# Patient Record
Sex: FEMALE | Race: ASIAN | Employment: OTHER | URBAN - METROPOLITAN AREA
[De-identification: names, ages, dates, MRNs, and addresses within clinical notes are randomized per-mention and may not be internally consistent; named-entity substitution may affect disease eponyms.]

---

## 2018-06-05 RX ORDER — CETIRIZINE HCL 10 MG
TABLET ORAL
COMMUNITY
End: 2019-07-17

## 2018-06-06 ENCOUNTER — ANESTHESIA EVENT (OUTPATIENT)
Dept: ENDOSCOPY | Age: 78
End: 2018-06-06
Payer: MEDICARE

## 2018-06-07 ENCOUNTER — ANESTHESIA (OUTPATIENT)
Dept: ENDOSCOPY | Age: 78
End: 2018-06-07
Payer: MEDICARE

## 2018-06-07 ENCOUNTER — HOSPITAL ENCOUNTER (OUTPATIENT)
Age: 78
Setting detail: OUTPATIENT SURGERY
Discharge: HOME | End: 2018-06-07
Attending: INTERNAL MEDICINE | Admitting: INTERNAL MEDICINE
Payer: MEDICARE

## 2018-06-07 VITALS
HEIGHT: 60 IN | TEMPERATURE: 97.1 F | SYSTOLIC BLOOD PRESSURE: 138 MMHG | DIASTOLIC BLOOD PRESSURE: 64 MMHG | OXYGEN SATURATION: 96 % | WEIGHT: 115.5 LBS | RESPIRATION RATE: 14 BRPM | HEART RATE: 50 BPM | BODY MASS INDEX: 22.68 KG/M2

## 2018-06-07 PROCEDURE — 74011250636 HC RX REV CODE- 250/636: Performed by: NURSE ANESTHETIST, CERTIFIED REGISTERED

## 2018-06-07 PROCEDURE — 76060000031 HC ANESTHESIA FIRST 0.5 HR: Performed by: INTERNAL MEDICINE

## 2018-06-07 PROCEDURE — 76040000019: Performed by: INTERNAL MEDICINE

## 2018-06-07 PROCEDURE — 74011250636 HC RX REV CODE- 250/636

## 2018-06-07 PROCEDURE — 88305 TISSUE EXAM BY PATHOLOGIST: CPT | Performed by: INTERNAL MEDICINE

## 2018-06-07 PROCEDURE — 77030009426 HC FCPS BIOP ENDOSC BSC -B: Performed by: INTERNAL MEDICINE

## 2018-06-07 RX ORDER — SODIUM CHLORIDE 0.9 % (FLUSH) 0.9 %
5-10 SYRINGE (ML) INJECTION EVERY 8 HOURS
Status: CANCELLED | OUTPATIENT
Start: 2018-06-07 | End: 2018-06-07

## 2018-06-07 RX ORDER — SODIUM CHLORIDE, SODIUM LACTATE, POTASSIUM CHLORIDE, CALCIUM CHLORIDE 600; 310; 30; 20 MG/100ML; MG/100ML; MG/100ML; MG/100ML
50 INJECTION, SOLUTION INTRAVENOUS CONTINUOUS
Status: DISCONTINUED | OUTPATIENT
Start: 2018-06-08 | End: 2018-06-07 | Stop reason: HOSPADM

## 2018-06-07 RX ORDER — SODIUM CHLORIDE 0.9 % (FLUSH) 0.9 %
5-10 SYRINGE (ML) INJECTION AS NEEDED
Status: DISCONTINUED | OUTPATIENT
Start: 2018-06-07 | End: 2018-06-07 | Stop reason: HOSPADM

## 2018-06-07 RX ORDER — SODIUM CHLORIDE 9 MG/ML
25 INJECTION, SOLUTION INTRAVENOUS CONTINUOUS
Status: CANCELLED | OUTPATIENT
Start: 2018-06-07 | End: 2018-06-07

## 2018-06-07 RX ORDER — SODIUM CHLORIDE 0.9 % (FLUSH) 0.9 %
5-10 SYRINGE (ML) INJECTION EVERY 8 HOURS
Status: DISCONTINUED | OUTPATIENT
Start: 2018-06-07 | End: 2018-06-07 | Stop reason: HOSPADM

## 2018-06-07 RX ORDER — SODIUM CHLORIDE 0.9 % (FLUSH) 0.9 %
5-10 SYRINGE (ML) INJECTION AS NEEDED
Status: CANCELLED | OUTPATIENT
Start: 2018-06-07 | End: 2018-06-07

## 2018-06-07 RX ORDER — PROPOFOL 10 MG/ML
INJECTION, EMULSION INTRAVENOUS AS NEEDED
Status: DISCONTINUED | OUTPATIENT
Start: 2018-06-07 | End: 2018-06-07 | Stop reason: HOSPADM

## 2018-06-07 RX ADMIN — PROPOFOL 20 MG: 10 INJECTION, EMULSION INTRAVENOUS at 08:22

## 2018-06-07 RX ADMIN — PROPOFOL 20 MG: 10 INJECTION, EMULSION INTRAVENOUS at 08:24

## 2018-06-07 RX ADMIN — SODIUM CHLORIDE, SODIUM LACTATE, POTASSIUM CHLORIDE, AND CALCIUM CHLORIDE 50 ML/HR: 600; 310; 30; 20 INJECTION, SOLUTION INTRAVENOUS at 07:34

## 2018-06-07 RX ADMIN — PROPOFOL 50 MG: 10 INJECTION, EMULSION INTRAVENOUS at 08:19

## 2018-06-07 NOTE — ANESTHESIA PREPROCEDURE EVALUATION
Anesthetic History   No history of anesthetic complications            Review of Systems / Medical History  Patient summary reviewed and pertinent labs reviewed    Pulmonary  Within defined limits                 Neuro/Psych   Within defined limits           Cardiovascular  Within defined limits                Exercise tolerance: >4 METS     GI/Hepatic/Renal  Within defined limits              Endo/Other  Within defined limits           Other Findings   Comments: Documentation of current medication  Current medications obtained, documented and obtained? YES      Risk Factors for Postoperative nausea/vomiting:       History of postoperative nausea/vomiting? NO       Female? YES       Motion sickness? NO       Intended opioid administration for postoperative analgesia? NO      Smoking Abstinence:  Current Smoker? NO  Elective Surgery? YES  Seen preoperatively by anesthesiologist or proxy prior to day of surgery? YES  Pt abstained from smoking 24 hours prior to anesthesia?  N/A    Preventive care/screening for High Blood Pressure:  Aged 18 years and older: YES  Screened for high blood pressure: YES  Patients with high blood pressure referred to primary care provider   for BP management: YES                 Physical Exam    Airway  Mallampati: II  TM Distance: 4 - 6 cm  Neck ROM: normal range of motion   Mouth opening: Normal     Cardiovascular  Regular rate and rhythm,  S1 and S2 normal,  no murmur, click, rub, or gallop  Rhythm: regular  Rate: normal         Dental  No notable dental hx       Pulmonary  Breath sounds clear to auscultation               Abdominal  GI exam deferred       Other Findings            Anesthetic Plan    ASA: 1  Anesthesia type: MAC          Induction: Intravenous  Anesthetic plan and risks discussed with: Patient and Son / Daughter

## 2018-06-07 NOTE — IP AVS SNAPSHOT
[facility] 
 
 
 [address]  
[phone] Patient: Kathleen Orellana MRN: [id number] SKB:[id number] About your hospitalization You were admitted on:  June 7, 2018 You last received care in the:  [facility] ENDOSCOPY You were discharged on:  June 7, 2018 Why you were hospitalized Your primary diagnosis was:  Not on File Follow-up Information Follow up With Details Comments Contact Info Ranjit Castaneda MD   [address] [place] [address] 
[phone] Matilda Swanson MD Follow up in 6 week(s)  [address] [place] [address] 
[phone] Discharge Orders None A check natalia indicates which time of day the medication should be taken. My Medications CONTINUE taking these medications Instructions Each Dose to Equal  
 Morning Noon Evening Bedtime ZyrTEC 10 mg tablet Generic drug:  cetirizine Your last dose was: Your next dose is: Take  by mouth. Discharge Instructions EGD DISCHARGE INSTRUCTIONS You have just had an examination of your esophagus (swallowing tube), stomach and duodenum (the first part of your small intestine) and of your colon (large intestine). The medication given to you during your procedure will be acting in your body for the next 12 hours, gradually wearing off. Your face may be flushed, skin may be warm and sweaty, you might feel a little bloated or nauseated and sleepy. 1. For the next 12 hours you SHOULD NOT: 
 
a. Drive, operate any machinery. b. Drink alcohol. 
c. Engage in activities that require mental sharpness or manual dexterity such as cooking. d. Take any medications other than prescribed by a physician. 
e. Make any legal or financial decisions. 2. Call this office immediately, if: 
 
a. Onset of new or increase of santosh rectal bleeding. 
b. Fever > 101 c. Increased abdominal pain Additional instructions: 
 
a. Diet as recommended 
b. Due to risk of dehydration after colon prep and sedation, avoid extended periods of time outdoors if the day is hot and humid. c. If biopsies were taken, you will receive a post card or telephone call with results of your biopsies and suggestion for your next colonoscopy. Please allow us at least 3 weeks to get back with you about your results. If we have not contacted you by the, please call us for results. # [phone] 
d. If you had polyp(s) removed DO NOT TAKE NSAIDS (Aspirin, Advil, Aleve, Naproxyn, Ibuprofen, etc) for 2 weeks. Tylenol is OK to use. Upper GI Endoscopy: What to Expect at [facility] Your Recovery After you have an endoscopy, you will stay at the hospital or clinic for 1 to 2 hours. This will allow the medicine to wear off. You will be able to go home after your doctor or nurse checks to make sure you are not having any problems. You may have to stay overnight if you had treatment during the test. You may have a sore throat for a day or two after the test. 
This care sheet gives you a general idea about what to expect after the test. 
How can you care for yourself at home? Activity · Rest as much as you need to after you go home. · You should be able to go back to your usual activities the day after the test. 
Diet · Follow your doctor's directions for eating after the test. 
· Drink plenty of fluids (unless your doctor has told you not to). Medications · If you have a sore throat the day after the test, use an over-the-counter spray to numb your throat. Follow-up care is a key part of your treatment and safety. Be sure to make and go to all appointments, and call your doctor if you are having problems. It's also a good idea to know your test results and keep a list of the medicines you take. When should you call for help? Call 911 anytime you think you may need emergency care. For example, call if: 
? · You passed out (loses consciousness). ? · You have trouble breathing. ? · You pass maroon or bloody stools. ?Call your doctor now or seek immediate medical care if: 
? · You have pain that does not get better after your take pain medicine. ? · You have new or worse belly pain. ? · You have blood in your stools. ? · You are sick to your stomach and cannot keep fluids down. ? · You have a fever. ? · You cannot pass stools or gas. ? Watch closely for changes in your health, and be sure to contact your doctor if: 
? · Your throat still hurts after a day or two. ? · You do not get better as expected. Where can you learn more? Go to http://yosvany-yoel.info/. Enter ([phone] [phone] in the search box to learn more about \"Upper GI Endoscopy: What to Expect at Home. \" Current as of: May 12, 2017 Content Version: 11.4 © 5459-1685 [employer]. Care instructions adapted under license by [employer] (which disclaims liability or warranty for this information). If you have questions about a medical condition or this instruction, always ask your healthcare professional. [address] any warranty or liability for your use of this information. Patient discharged without removing armband and transfered to another healthcare acute, sub acute , or extended care facility. Informed of privacy risks if armband lost or stolen Introducing hospitals & HEALTH SERVICES! Tobin Fatima introduces GiveSurance patient portal. Now you can access parts of your medical record, email your doctor's office, and request medication refills online. 1. In your internet browser, go to [email or web address] Weft/License Acquisitions 2. Click on the First Time User? Click Here link in the Sign In box. You will see the New Member Sign Up page. 3. Enter your SyncSumt Access Code exactly as it appears below. You will not need to use this code after youve completed the sign-up process. If you do not sign up before the expiration date, you must request a new code. · SNADEC Access Code: [facility] Expires: 7/22/2018 11:26 AM 
 
4. Enter the last four digits of your Social Security Number (xxxx) and Date of Birth (mm/dd/yyyy) as indicated and click Submit. You will be taken to the next sign-up page. 5. Create a SyncSumt ID. This will be your SNADEC login ID and cannot be changed, so think of one that is secure and easy to remember. 6. Create a SyncSumt password. You can change your password at any time. 7. Enter your Password Reset Question and Answer. This can be used at a later time if you forget your password. 8. Enter your e-mail address. You will receive e-mail notification when new information is available in [address] 9. Click Sign Up. You can now view and download portions of your medical record. 10. Click the Download Summary menu link to download a portable copy of your medical information. If you have questions, please visit the Frequently Asked Questions section of the SNADEC website. Remember, SNADEC is NOT to be used for urgent needs. For medical emergencies, dial 911. Now available from your iPhone and Android! Introducing Adolfo Tanner As a [facility] patient, I wanted to make you aware of our electronic visit tool called Adolfo Xaviernatali. [facility] 24/7 allows you to connect within minutes with a medical provider 24 hours a day, seven days a week via a mobile device or tablet or logging into a secure website from your computer. You can access Adolfo Xaviercecilfin from anywhere in the [place].  
 
A virtual visit might be right for you when you have a simple condition and feel like you just dont want to get out of bed, or cant get away from work for an appointment, when your regular [facility] provider is not available (evenings, weekends or holidays), or when youre out of town and need minor care. Electronic visits cost only $49 and if the [employer] [facility] 24/7 provider determines a prescription is needed to treat your condition, one can be electronically transmitted to a nearby pharmacy*. Please take a moment to enroll today if you have not already done so. The enrollment process is free and takes just a few minutes. To enroll, please download the [employer] cooper to your tablet or phone, or visit [email or web address] to enroll on your computer. And, as an [address] [place] [address] patient with a Notch account, the results of your visits will be scanned into your electronic medical record and your primary care provider will be able to view the scanned results. We urge you to continue to see your regular [facility] provider for your ongoing medical care. And while your primary care provider may not be the one available when you seek a Bern[employer] Films[employer] virtual visit, the peace of mind you get from getting a real diagnosis real time can be priceless. For more information on Tiem[employer], view our Frequently Asked Questions (FAQs) at [email or web address]. Sincerely, 
 
Adrian Valadez MD 
Chief Medical Officer [employer] *:  certain medications cannot be prescribed via Tiempy Providers Seen During Your Hospitalization Provider Specialty Primary office phone Carmen Maher MD Gastroenterology [phone] Your Primary Care Physician (PCP) Primary Care Physician Office Phone Office Fax  [address], [address] [phone] You are allergic to the following No active allergies Recent Documentation Height Weight BMI OB Status Smoking Status 1.524 m 52.4 kg 22.56 kg/m2 Hysterectomy Never Smoker Emergency Contacts Name Discharge Info Relation Home Work Mobile Reinaldo Lee DISCHARGE CAREGIVER [3] Son [22]   [phone] Patient Belongings The following personal items are in your possession at time of discharge: 
  Dental Appliances: None  Visual Aid: Glasses   Hearing Aids/Status: Left Please provide this summary of care documentation to your next provider. Signatures-by signing, you are acknowledging that this After Visit Summary has been reviewed with you and you have received a copy. Patient Signature:  ____________________________________________________________ Date:  ____________________________________________________________  
  
[facility] Provider Signature:  ____________________________________________________________ Date:  ____________________________________________________________

## 2018-06-07 NOTE — ANESTHESIA POSTPROCEDURE EVALUATION
Post-Anesthesia Evaluation & Assessment    Visit Vitals    /64    Pulse (!) 50    Temp 36.2 °C (97.1 °F)    Resp 14    Ht 5' (1.524 m)    Wt 52.4 kg (115 lb 8 oz)    SpO2 96%    BMI 22.56 kg/m2       Nausea/Vomiting: no nausea    Pain score (VAS): 0    Post-operative hydration adequate.     Mental status & Level of consciousness: orientation per pre-anesthetic level    Neurological status: moves all extremities, sensation grossly intact    Pulmonary status: airway patent, no supplemental oxygen required    Complications related to anesthesia: none    Additional comments:        Justen Colindres CRNA  June 7, 2018

## 2018-06-07 NOTE — H&P
Date of Surgery Update:  [address] was seen and examined. History and physical has been reviewed. The patient has been examined.  There have been no significant clinical changes since the completion of the originally dated History and Physical.    Signed By: Kirt Turner MD     June 7, 2018 8:13 AM

## 2018-06-07 NOTE — PROCEDURES
EGD Procedure Note    Patient: Alison Pappas MRN: [id number]  SSN: [id number]    YOB: 1940  Age: 78 y.o. Sex: female      Date of Procedure: 6/7/2018      Procedures:  EGD :    HISTORY UPDATE: History and physical has been reviewed. The patient has been examined. There have been no significant clinical changes since the completion of the originally dated History and Physical.    INDICATION:  Abdominal pain, epigastric    PROCEDURE PERFORMED: EGD    ENDOSCOPIST: Jerri West MD    ASSISTANT:  Endoscopy Technician-1: Thi Shin  Endoscopy RN-1: Fanny Bray RN    CLASSIFICATION OF PREOPERATIVE RISK: ASA 2 - Patient with mild systemic disease with no functional limitations    ANESTHESIA:  MAC anesthesia    ENDOSCOPE: [id number]                                                                                                              EXTENT OF EXAM: Second portion of the duodenum      DESCRIPTION OF PROCEDURE:   The procedure was discussed with the patient including purpose, risks, benefits and alternatives including but not limited to IV conscious sedation, bleeding, perforation and aspiration and the consent form was signed and witnessed. A safety timeout was performed. Procedure done with MAC. The patients vital signs were monitored at all times including heart rate and rhythm, oxygen saturation, and blood pressure. The patient was then placed into the left lateral decubitus position. The Olympus adult diagnostic endoscope was then passed under direct visualization to the second portion of the duodenum. The endoscope was then slowly withdrawn while closely visualizing the mucosa. In the stomach a retroflexion was performed and gastric fundus and cardia visualized. The scope was then removed. The patient was then transferred to the recovery room. FINDINGS:   Esophagus: The esophageal mucosa was normal with no ulceration, mass or stricture.   There was no evidence of Acosta's esophagus or reflux esophagitis. Z line at 38 cm. Stomach: The gastric mucosa showed no ulceration, mass, stricture. Erosive gastritis seen in the antrum, biopsies done to r/o H. Pylori. Retroflexion revealed a normal cardia and fundus      Duodenum: The duodenum mucosa was normal with no ulceration, mass,  stricture and no evidence of villous atrophy. EBL: None      SPECIMENS:   ID Type Source Tests Collected by Time Destination   1 : Bxs r/o h pylori Preservative Gastric  Matilda Swanson MD 6/7/2018 8042 Pathology       IMPRESSION: Erosive gastritis seen in the antrum, biopsies done to r/o H. Pylori. PLAN 1: Discharge when sedation criteria are met. 2.  Resume regular Diet as tolerated. 3. Follow up on the biopsy results 4. Start Omeprazole 40 mg po q day 5.  Avoid NSAID use       Follow Up:  As scheduled      Nguyễn Howell MD  6/7/2018

## 2018-06-07 NOTE — DISCHARGE INSTRUCTIONS
EGD DISCHARGE INSTRUCTIONS    You have just had an examination of your esophagus (swallowing tube), stomach and duodenum (the first part of your small intestine) and of your colon (large intestine). The medication given to you during your procedure will be acting in your body for the next 12 hours, gradually wearing off. Your face may be flushed, skin may be warm and sweaty, you might feel a little bloated or nauseated and sleepy. 1. For the next 12 hours you SHOULD NOT:    a. Drive, operate any machinery. b. Drink alcohol.  c. Engage in activities that require mental sharpness or manual dexterity such as cooking. d. Take any medications other than prescribed by a physician.  e. Make any legal or financial decisions. 2. Call this office immediately, if:    a. Onset of new or increase of santosh rectal bleeding.  b. Fever > 101  c. Increased abdominal pain    Additional instructions:    a. Diet as recommended  b. Due to risk of dehydration after colon prep and sedation, avoid extended periods of time outdoors if the day is hot and humid. c. If biopsies were taken, you will receive a post card or telephone call with results of your biopsies and suggestion for your next colonoscopy. Please allow us at least 3 weeks to get back with you about your results. If we have not contacted you by the, please call us for results. # [phone]  d. If you had polyp(s) removed DO NOT TAKE NSAIDS (Aspirin, Advil, Aleve, Naproxyn, Ibuprofen, etc) for 2 weeks. Tylenol is OK to use. Upper GI Endoscopy: What to Expect at [address] [place] [address]  After you have an endoscopy, you will stay at the hospital or clinic for 1 to 2 hours. This will allow the medicine to wear off. You will be able to go home after your doctor or nurse checks to make sure you are not having any problems.   You may have to stay overnight if you had treatment during the test. You may have a sore throat for a day or two after the test.  This care sheet gives you a general idea about what to expect after the test.  How can you care for yourself at home? Activity  · Rest as much as you need to after you go home. · You should be able to go back to your usual activities the day after the test.  Diet  · Follow your doctor's directions for eating after the test.  · Drink plenty of fluids (unless your doctor has told you not to). Medications  · If you have a sore throat the day after the test, use an over-the-counter spray to numb your throat. Follow-up care is a key part of your treatment and safety. Be sure to make and go to all appointments, and call your doctor if you are having problems. It's also a good idea to know your test results and keep a list of the medicines you take. When should you call for help? Call 911 anytime you think you may need emergency care. For example, call if:  ? · You passed out (loses consciousness). ? · You have trouble breathing. ? · You pass maroon or bloody stools. ?Call your doctor now or seek immediate medical care if:  ? · You have pain that does not get better after your take pain medicine. ? · You have new or worse belly pain. ? · You have blood in your stools. ? · You are sick to your stomach and cannot keep fluids down. ? · You have a fever. ? · You cannot pass stools or gas. ? Watch closely for changes in your health, and be sure to contact your doctor if:  ? · Your throat still hurts after a day or two. ? · You do not get better as expected. Where can you learn more? Go to http://yosvany-yoel.info/. Enter ([phone] 802-314 in the search box to learn more about \"Upper GI Endoscopy: What to Expect at Home. \"  Current as of: May 12, 2017  Content Version: 11.4  © 9459-6895 [employer]. Care instructions adapted under license by [employer] (which disclaims liability or warranty for this information).  If you have questions about a medical condition or this instruction, always ask your healthcare professional. [place] [address] any warranty or liability for your use of this information. Patient discharged without removing armband and transfered to another healthcare acute, sub acute , or extended care facility.   Informed of privacy risks if armband lost or stolen

## 2021-03-30 PROBLEM — R10.84 GENERALIZED ABDOMINAL PAIN: Chronic | Status: ACTIVE | Noted: 2021-03-30

## (undated) DEVICE — BITE BLK ENDOSCP AD 54FR GRN POLYETH ENDOSCP W STRP SLD

## (undated) DEVICE — KIT COLON W/ 1.1OZ LUB AND 2 END

## (undated) DEVICE — FLEX ADVANTAGE 1500CC: Brand: FLEX ADVANTAGE

## (undated) DEVICE — CONTAINER PREFIL FRMLN 15ML --

## (undated) DEVICE — BASIN EMESIS 500CC ROSE 250/CS 60/PLT: Brand: MEDEGEN MEDICAL PRODUCTS, LLC

## (undated) DEVICE — KENDALL 500 SERIES DIAPHORETIC FOAM MONITORING ELECTRODE - TEAR DROP SHAPE ( 30/PK): Brand: KENDALL

## (undated) DEVICE — FORCEPS BX L240CM JAW DIA2.8MM L CAP W/ NDL MIC MESH TOOTH

## (undated) DEVICE — MEDI-VAC NON-CONDUCTIVE SUCTION TUBING: Brand: CARDINAL HEALTH

## (undated) DEVICE — SYR 50ML SLIP TIP NSAF LF STRL --